# Patient Record
Sex: MALE | Race: WHITE | NOT HISPANIC OR LATINO | Employment: FULL TIME | URBAN - NONMETROPOLITAN AREA
[De-identification: names, ages, dates, MRNs, and addresses within clinical notes are randomized per-mention and may not be internally consistent; named-entity substitution may affect disease eponyms.]

---

## 2017-03-18 ENCOUNTER — APPOINTMENT (OUTPATIENT)
Dept: CT IMAGING | Facility: HOSPITAL | Age: 57
End: 2017-03-18

## 2017-03-18 ENCOUNTER — APPOINTMENT (OUTPATIENT)
Dept: GENERAL RADIOLOGY | Facility: HOSPITAL | Age: 57
End: 2017-03-18

## 2017-03-18 ENCOUNTER — HOSPITAL ENCOUNTER (EMERGENCY)
Facility: HOSPITAL | Age: 57
Discharge: HOME | End: 2017-03-18
Admitting: EMERGENCY MEDICINE

## 2017-03-18 VITALS
OXYGEN SATURATION: 97 % | WEIGHT: 180 LBS | BODY MASS INDEX: 27.28 KG/M2 | RESPIRATION RATE: 18 BRPM | HEART RATE: 66 BPM | SYSTOLIC BLOOD PRESSURE: 169 MMHG | HEIGHT: 68 IN | TEMPERATURE: 97.3 F | DIASTOLIC BLOOD PRESSURE: 95 MMHG

## 2017-03-18 DIAGNOSIS — S82.141A TIBIAL PLATEAU FRACTURE, RIGHT, CLOSED, INITIAL ENCOUNTER: Primary | ICD-10-CM

## 2017-03-18 PROCEDURE — 99283 EMERGENCY DEPT VISIT LOW MDM: CPT

## 2017-03-18 PROCEDURE — 73562 X-RAY EXAM OF KNEE 3: CPT

## 2017-03-18 PROCEDURE — 73700 CT LOWER EXTREMITY W/O DYE: CPT

## 2017-03-18 RX ORDER — OXYCODONE AND ACETAMINOPHEN 7.5; 325 MG/1; MG/1
1 TABLET ORAL EVERY 4 HOURS PRN
Qty: 15 TABLET | Refills: 0 | Status: SHIPPED | OUTPATIENT
Start: 2017-03-18 | End: 2019-10-04 | Stop reason: ALTCHOICE

## 2017-03-18 RX ORDER — OXYCODONE AND ACETAMINOPHEN 7.5; 325 MG/1; MG/1
1 TABLET ORAL ONCE
Status: COMPLETED | OUTPATIENT
Start: 2017-03-18 | End: 2017-03-18

## 2017-03-18 RX ADMIN — OXYCODONE HYDROCHLORIDE AND ACETAMINOPHEN 1 TABLET: 7.5; 325 TABLET ORAL at 11:08

## 2017-03-18 NOTE — ED PROVIDER NOTES
Subjective   HPI Comments: Since 56-year-old white male presents with right knee pain.  He states that he fell at home going down some stairs 2 days ago and twisted his knee.  He has had moderate pain and swelling to the right knee since.    Patient is a 56 y.o. male presenting with knee pain.   History provided by:  Patient   used: No    Knee Pain       Review of Systems   Constitutional: Negative.    HENT: Negative.    Eyes: Negative.    Respiratory: Negative.    Cardiovascular: Negative.    Gastrointestinal: Negative.    Endocrine: Negative.    Genitourinary: Negative.    Musculoskeletal:        Pt states that he slipped while going down some stairs at his home 2 days ago injuring his right knee. He states that he has had increased pain since and continues to have swelling to knee.    Skin: Negative.    Allergic/Immunologic: Negative.    Neurological: Negative.    Hematological: Negative.    Psychiatric/Behavioral: Negative.    All other systems reviewed and are negative.      Past Medical History   Diagnosis Date   • Acid reflux    • Heart disease    • Hyperchloremia    • Hypertension        Allergies   Allergen Reactions   • Crestor [Rosuvastatin]        Past Surgical History   Procedure Laterality Date   • Cholecystectomy     • Hernia repair     • Leg surgery     • Cardiac catheterization         Family History   Problem Relation Age of Onset   • No Known Problems Father    • No Known Problems Mother        Social History     Social History   • Marital status:      Spouse name: N/A   • Number of children: N/A   • Years of education: N/A     Social History Main Topics   • Smoking status: Current Every Day Smoker   • Smokeless tobacco: None   • Alcohol use None   • Drug use: None   • Sexual activity: Not Asked     Other Topics Concern   • None     Social History Narrative       Prior to Admission medications    Medication Sig Start Date End Date Taking? Authorizing Provider  "  atorvastatin (LIPITOR) 10 MG tablet  9/15/16   Historical Provider, MD   clopidogrel (PLAVIX) 75 MG tablet  9/15/16   Historical Provider, MD   isosorbide mononitrate (IMDUR) 30 MG 24 hr tablet  9/15/16   Historical Provider, MD   losartan-hydrochlorothiazide (HYZAAR) 100-12.5 MG per tablet  9/7/16   Historical Provider, MD   metoprolol succinate XL (TOPROL-XL) 25 MG 24 hr tablet  9/7/16   Historical Provider, MD   NEXIUM 40 MG capsule  9/7/16   Historical Provider, MD       Visit Vitals   • /94   • Pulse 83   • Temp 97.3 °F (36.3 °C)   • Resp 16   • Ht 68\" (172.7 cm)   • Wt 180 lb (81.6 kg)   • SpO2 96%   • BMI 27.37 kg/m2       Objective   Physical Exam   Constitutional: He is oriented to person, place, and time. He appears well-developed and well-nourished. He is active. No distress.   HENT:   Head: Normocephalic. Head is without raccoon's eyes, without Altamirano's sign, without abrasion, without contusion and without laceration.   Right Ear: Tympanic membrane normal.   Left Ear: Tympanic membrane normal.   Nose: Nose normal.   Eyes: Conjunctivae, EOM and lids are normal. Pupils are equal, round, and reactive to light.   Neck: Trachea normal and normal range of motion. Neck supple. Normal carotid pulses and no JVD present. No spinous process tenderness and no muscular tenderness present. No rigidity. No tracheal deviation and normal range of motion present.   Cardiovascular: Normal rate, regular rhythm, normal heart sounds, intact distal pulses and normal pulses.    Pulmonary/Chest: Effort normal and breath sounds normal. No accessory muscle usage or stridor. No respiratory distress. He exhibits no mass, no tenderness, no bony tenderness, no laceration, no crepitus, no deformity and no swelling.   Abdominal: Soft. Normal appearance, normal aorta and bowel sounds are normal. He exhibits no distension and no pulsatile midline mass. There is no tenderness.   Musculoskeletal: He exhibits no edema, tenderness or " deformity.        Cervical back: Normal. He exhibits no tenderness, no bony tenderness, no deformity and no pain.        Thoracic back: Normal. He exhibits no tenderness, no bony tenderness, no pain and no spasm.        Lumbar back: Normal. He exhibits normal range of motion, no tenderness, no bony tenderness and no deformity.   Right knee: moderate amt of soft tissue swelling to anterior right knee. Small joint effusion noted. Increased pain with flexion. Pain mostly to medial aspect. Pedal pulses palp    Neurological: He is alert and oriented to person, place, and time. He has normal strength and normal reflexes. He displays normal reflexes. No cranial nerve deficit or sensory deficit. He exhibits normal muscle tone. GCS eye subscore is 4. GCS verbal subscore is 5. GCS motor subscore is 6.   Skin: Skin is warm, dry and intact. He is not diaphoretic. No pallor.   Psychiatric: He has a normal mood and affect. His speech is normal and behavior is normal.   Nursing note and vitals reviewed.      Procedures         Lab Results (last 24 hours)     ** No results found for the last 24 hours. **          CT Lower Extremity Right Without Contrast   Final Result      XR Knee 3 View Right   Final Result          ED Course  ED Course   Comment By Time   Reviewed results with pt and pt family. Reviewed pt and pt care plan with dr glez- also in agreement with pt care plan. Advised pt to call dr braron on Monday for follow up appointment. Ciaran report completed #[id number]. No signs of suspicious activity noted. Will write for small amt of pain medication for acute pain. Reviewed side effects and potential for abuse  Lary Church, APRN 03/18 1213          MDM  Number of Diagnoses or Management Options  Tibial plateau fracture, right, closed, initial encounter: new and requires workup     Amount and/or Complexity of Data Reviewed  Tests in the radiology section of CPT®: ordered and reviewed  Independent visualization of  images, tracings, or specimens: yes    Patient Progress  Patient progress: stable      Final diagnoses:   Tibial plateau fracture, right, closed, initial encounter          Lary Church, APRN  03/18/17 [id number]

## 2019-02-12 ENCOUNTER — TELEPHONE (OUTPATIENT)
Dept: UROLOGY | Facility: CLINIC | Age: 59
End: 2019-02-12

## 2019-02-12 NOTE — TELEPHONE ENCOUNTER
Called and left a message for the patient to call back. He was last seen on 9/29/16 fir elevated PSA which is what he was being referred back to us for. Patient just needs a follow up with [facility] for a Elevated PSA with new med recs in chart in the media tab.

## 2019-02-14 ENCOUNTER — TRANSCRIBE ORDERS (OUTPATIENT)
Dept: ADMINISTRATIVE | Facility: HOSPITAL | Age: 59
End: 2019-02-14

## 2019-02-14 DIAGNOSIS — I10 HYPERTENSION, UNSPECIFIED TYPE: Primary | ICD-10-CM

## 2019-02-15 RX ORDER — LOSARTAN POTASSIUM AND HYDROCHLOROTHIAZIDE 12.5; 1 MG/1; MG/1
1 TABLET ORAL DAILY
COMMUNITY

## 2019-02-15 RX ORDER — ATORVASTATIN CALCIUM 10 MG/1
10 TABLET, FILM COATED ORAL DAILY
COMMUNITY

## 2019-02-18 ENCOUNTER — HOSPITAL ENCOUNTER (OUTPATIENT)
Dept: CARDIOLOGY | Facility: HOSPITAL | Age: 59
Discharge: HOME | End: 2019-02-18
Admitting: INTERNAL MEDICINE

## 2019-02-18 VITALS
BODY MASS INDEX: 26.52 KG/M2 | SYSTOLIC BLOOD PRESSURE: 144 MMHG | HEART RATE: 70 BPM | DIASTOLIC BLOOD PRESSURE: 82 MMHG | HEIGHT: 68 IN | WEIGHT: 175 LBS

## 2019-02-18 LAB
BH CV STRESS BP STAGE 1: NORMAL
BH CV STRESS DURATION MIN STAGE 1: 2
BH CV STRESS DURATION SEC STAGE 1: 49
BH CV STRESS GRADE STAGE 1: 10
BH CV STRESS HR STAGE 1: 128
BH CV STRESS METS STAGE 1: 5
BH CV STRESS PROTOCOL 1: NORMAL
BH CV STRESS RECOVERY BP: NORMAL MMHG
BH CV STRESS RECOVERY HR: 82 BPM
BH CV STRESS SPEED STAGE 1: 1.7
BH CV STRESS STAGE 1: 1
MAXIMAL PREDICTED HEART RATE: 162 BPM
PERCENT MAX PREDICTED HR: 79.01 %
STRESS BASELINE BP: NORMAL MMHG
STRESS BASELINE HR: 71 BPM
STRESS PERCENT HR: 93 %
STRESS POST ESTIMATED WORKLOAD: 5 METS
STRESS POST EXERCISE DUR MIN: 2 MIN
STRESS POST EXERCISE DUR SEC: 49 SEC
STRESS POST PEAK BP: NORMAL MMHG
STRESS POST PEAK HR: 128 BPM
STRESS TARGET HR: 138 BPM

## 2019-02-18 PROCEDURE — 93017 CV STRESS TEST TRACING ONLY: CPT

## 2019-02-18 PROCEDURE — 93350 STRESS TTE ONLY: CPT | Performed by: INTERNAL MEDICINE

## 2019-02-18 PROCEDURE — 25010000002 PERFLUTREN 6.52 MG/ML SUSPENSION: Performed by: INTERNAL MEDICINE

## 2019-02-18 PROCEDURE — 93352 ADMIN ECG CONTRAST AGENT: CPT | Performed by: INTERNAL MEDICINE

## 2019-02-18 PROCEDURE — 93350 STRESS TTE ONLY: CPT

## 2019-02-18 PROCEDURE — 93018 CV STRESS TEST I&R ONLY: CPT | Performed by: INTERNAL MEDICINE

## 2019-02-18 RX ADMIN — PERFLUTREN 8.48 MG: 6.52 INJECTION, SUSPENSION INTRAVENOUS at 14:26

## 2019-02-19 ENCOUNTER — TRANSCRIBE ORDERS (OUTPATIENT)
Dept: ADMINISTRATIVE | Facility: HOSPITAL | Age: 59
End: 2019-02-19

## 2019-02-19 DIAGNOSIS — I15.9 SECONDARY HYPERTENSION: Primary | ICD-10-CM

## 2019-02-21 ENCOUNTER — HOSPITAL ENCOUNTER (OUTPATIENT)
Dept: CARDIOLOGY | Facility: HOSPITAL | Age: 59
Discharge: HOME | End: 2019-02-21
Admitting: NURSE PRACTITIONER

## 2019-02-21 VITALS
WEIGHT: 175.04 LBS | HEART RATE: 60 BPM | BODY MASS INDEX: 26.53 KG/M2 | HEIGHT: 68 IN | SYSTOLIC BLOOD PRESSURE: 129 MMHG | DIASTOLIC BLOOD PRESSURE: 76 MMHG

## 2019-02-21 DIAGNOSIS — I15.9 SECONDARY HYPERTENSION: ICD-10-CM

## 2019-02-21 PROCEDURE — 93017 CV STRESS TEST TRACING ONLY: CPT

## 2019-02-21 PROCEDURE — 25010000002 PERFLUTREN 6.52 MG/ML SUSPENSION: Performed by: INTERNAL MEDICINE

## 2019-02-21 PROCEDURE — 93350 STRESS TTE ONLY: CPT | Performed by: INTERNAL MEDICINE

## 2019-02-21 PROCEDURE — 93018 CV STRESS TEST I&R ONLY: CPT | Performed by: INTERNAL MEDICINE

## 2019-02-21 PROCEDURE — 93352 ADMIN ECG CONTRAST AGENT: CPT | Performed by: INTERNAL MEDICINE

## 2019-02-21 PROCEDURE — 93350 STRESS TTE ONLY: CPT

## 2019-02-21 PROCEDURE — 25010000003 DOBUTAMINE PER 250 MG: Performed by: INTERNAL MEDICINE

## 2019-02-21 RX ORDER — AMLODIPINE BESYLATE 10 MG/1
10 TABLET ORAL DAILY
COMMUNITY

## 2019-02-21 RX ORDER — LOSARTAN POTASSIUM AND HYDROCHLOROTHIAZIDE 25; 100 MG/1; MG/1
1 TABLET ORAL DAILY
COMMUNITY
End: 2019-10-04 | Stop reason: ALTCHOICE

## 2019-02-21 RX ORDER — DOBUTAMINE HYDROCHLORIDE 100 MG/100ML
10-50 INJECTION INTRAVENOUS CONTINUOUS
Status: DISCONTINUED | OUTPATIENT
Start: 2019-02-21 | End: 2019-02-22 | Stop reason: HOSPADM

## 2019-02-21 RX ORDER — METOPROLOL TARTRATE 50 MG/1
50 TABLET, FILM COATED ORAL 2 TIMES DAILY
COMMUNITY

## 2019-02-21 RX ORDER — GUAIFENESIN 600 MG/1
1200 TABLET, EXTENDED RELEASE ORAL 2 TIMES DAILY PRN
COMMUNITY
End: 2020-02-28

## 2019-02-21 RX ADMIN — Medication 10 MCG/KG/MIN: at 15:10

## 2019-02-21 RX ADMIN — PERFLUTREN 8.48 MG: 6.52 INJECTION, SUSPENSION INTRAVENOUS at 15:09

## 2019-02-21 RX ADMIN — ATROPINE SULFATE 1 MG: 0.1 INJECTION, SOLUTION ENDOTRACHEAL; INTRAMUSCULAR; INTRAVENOUS; SUBCUTANEOUS at 15:24

## 2019-02-22 LAB
BH CV STRESS BP STAGE 1: NORMAL
BH CV STRESS BP STAGE 2: NORMAL
BH CV STRESS BP STAGE 3: NORMAL
BH CV STRESS DOSE DOBUTAMINE STAGE 1: 10
BH CV STRESS DOSE DOBUTAMINE STAGE 2: 20
BH CV STRESS DOSE DOBUTAMINE STAGE 3: 30
BH CV STRESS DURATION MIN STAGE 1: 3
BH CV STRESS DURATION MIN STAGE 2: 3
BH CV STRESS DURATION MIN STAGE 3: 3
BH CV STRESS DURATION SEC STAGE 1: 0
BH CV STRESS DURATION SEC STAGE 2: 0
BH CV STRESS DURATION SEC STAGE 3: 26
BH CV STRESS HR STAGE 1: 68
BH CV STRESS HR STAGE 2: 97
BH CV STRESS HR STAGE 3: 139
BH CV STRESS PROTOCOL 1: NORMAL
BH CV STRESS RECOVERY BP: NORMAL MMHG
BH CV STRESS RECOVERY HR: 88 BPM
BH CV STRESS STAGE 1: 1
BH CV STRESS STAGE 2: 2
BH CV STRESS STAGE 3: 3
MAXIMAL PREDICTED HEART RATE: 162 BPM
PERCENT MAX PREDICTED HR: 85.8 %
STRESS BASELINE BP: NORMAL MMHG
STRESS BASELINE HR: 60 BPM
STRESS PERCENT HR: 101 %
STRESS POST EXERCISE DUR MIN: 9 MIN
STRESS POST EXERCISE DUR SEC: 26 SEC
STRESS POST PEAK BP: NORMAL MMHG
STRESS POST PEAK HR: 139 BPM
STRESS TARGET HR: 138 BPM

## 2019-03-01 ENCOUNTER — TELEPHONE (OUTPATIENT)
Dept: GASTROENTEROLOGY | Age: 59
End: 2019-03-01

## 2019-07-12 ENCOUNTER — OFFICE VISIT (OUTPATIENT)
Dept: UROLOGY | Facility: CLINIC | Age: 59
End: 2019-07-12

## 2019-07-12 VITALS — WEIGHT: 175 LBS | HEIGHT: 68 IN | BODY MASS INDEX: 26.52 KG/M2 | TEMPERATURE: 97.8 F

## 2019-07-12 DIAGNOSIS — R97.20 ELEVATED PROSTATE SPECIFIC ANTIGEN (PSA): Primary | ICD-10-CM

## 2019-07-12 DIAGNOSIS — N40.0 BENIGN PROSTATIC HYPERPLASIA WITHOUT LOWER URINARY TRACT SYMPTOMS: ICD-10-CM

## 2019-07-12 LAB
BILIRUB BLD-MCNC: NEGATIVE MG/DL
CLARITY, POC: CLEAR
COLOR UR: YELLOW
GLUCOSE UR STRIP-MCNC: NEGATIVE MG/DL
KETONES UR QL: NEGATIVE
LEUKOCYTE EST, POC: NEGATIVE
NITRITE UR-MCNC: NEGATIVE MG/ML
PH UR: 7 [PH] (ref 5–8)
PROT UR STRIP-MCNC: NEGATIVE MG/DL
RBC # UR STRIP: NEGATIVE /UL
SP GR UR: 1.01 (ref 1–1.03)
UROBILINOGEN UR QL: NORMAL

## 2019-07-12 PROCEDURE — 81001 URINALYSIS AUTO W/SCOPE: CPT | Performed by: UROLOGY

## 2019-07-12 PROCEDURE — 99214 OFFICE O/P EST MOD 30 MIN: CPT | Performed by: UROLOGY

## 2019-07-12 NOTE — PATIENT INSTRUCTIONS
BMI for Adults  Body mass index (BMI) is a number that is calculated from a person's weight and height. In most adults, the number is used to find how much of an adult's weight is made up of fat. BMI is not as accurate as a direct measure of body fat.  How is BMI calculated?  BMI is calculated by dividing weight in kilograms by height in meters squared. It can also be calculated by dividing weight in pounds by height in inches squared, then multiplying the resulting number by 703. Charts are available to help you find your BMI quickly and easily without doing this calculation.  How is BMI interpreted?  Health care professionals use BMI charts to identify whether an adult is underweight, at a normal weight, or overweight based on the following guidelines:  · Underweight: BMI less than 18.5.  · Normal weight: BMI between 18.5 and 24.9.  · Overweight: BMI between 25 and 29.9.  · Obese: BMI of 30 and above.    BMI is usually interpreted the same for males and females.  Weight includes both fat and muscle, so someone with a muscular build, such as an athlete, may have a BMI that is higher than 24.9. In cases like these, BMI may not accurately depict body fat. To determine if excess body fat is the cause of a BMI of 25 or higher, further assessments may need to be done by a health care provider.  Why is BMI a useful tool?  BMI is used to identify a possible weight problem that may be related to a medical problem or may increase the risk for medical problems. BMI can also be used to promote changes to reach a healthy weight.  This information is not intended to replace advice given to you by your health care provider. Make sure you discuss any questions you have with your health care provider.  Document Released: 08/29/2005 Document Revised: 04/27/2017 Document Reviewed: 05/15/2015  [employer] Interactive Patient Education © 2018 [employer]    Smoking Tobacco Information, Adult  Smoking tobacco will very likely harm your  health. Tobacco contains a poisonous (toxic), colorless chemical called nicotine. Nicotine affects the brain and makes tobacco addictive. This change in your brain can make it hard to stop smoking. Tobacco also has other toxic chemicals that can hurt your body and raise your risk of many cancers.  How can smoking tobacco affect me?  Smoking tobacco can increase your chances of having serious health conditions, such as:  · Cancer. Smoking is most commonly associated with lung cancer, but can lead to cancer in other parts of the body.  · Chronic obstructive pulmonary disease (COPD). This is a long-term lung condition that makes it hard to breathe. It also gets worse over time.  · High blood pressure (hypertension), heart disease, stroke, or heart attack.  · Lung infections, such as pneumonia.  · Cataracts. This is when the lenses in the eyes become clouded.  · Digestive problems. This may include peptic ulcers, heartburn, and gastroesophageal reflux disease (GERD).  · Oral health problems, such as gum disease and tooth loss.  · Loss of taste and smell.    Smoking can affect your appearance by causing:  · Wrinkles.  · Yellow or stained teeth, fingers, and fingernails.    Smoking tobacco can also affect your social life.  · Many workplaces, restaurants, hotels, and public places are tobacco-free. This means that you may experience challenges in finding places to smoke when away from home.  · The cost of a smoking habit can be expensive. Expenses for someone who smokes come in two ways:  ? You spend money on a regular basis to buy tobacco.  ? Your health care costs in the long-term are higher if you smoke.  · Tobacco smoke can also affect the health of those around you. Children of smokers have greater chances of:  ? Sudden infant death syndrome (SIDS).  ? Ear infections.  ? Lung infections.    What lifestyle changes can be made?  · Do not start smoking. Quit if you already do.  · To quit smoking:  ? Make a plan to quit  smoking and commit yourself to it. Look for programs to help you and ask your health care provider for recommendations and ideas.  ? Talk with your health care provider about using nicotine replacement medicines to help you quit. Medicine replacement medicines include gum, lozenges, patches, sprays, or pills.  ? Do not replace cigarette smoking with electronic cigarettes, which are commonly called e-cigarettes. The safety of e-cigarettes is not known, and some may contain harmful chemicals.  ? Avoid places, people, or situations that tempt you to smoke.  ? If you try to quit but return to smoking, don't give up hope. It is very common for people to try a number of times before they fully succeed. When you feel ready again, give it another try.  · Quitting smoking might affect the way you eat as well as your weight. Be prepared to monitor your eating habits. Get support in planning and following a healthy diet.  · Ask your health care provider about having regular tests (screenings) to check for cancer. This may include blood tests, imaging tests, and other tests.  · Exercise regularly. Consider taking walks, joining a gym, or doing yoga or exercise classes.  · Develop skills to manage your stress. These skills include meditation.  What are the benefits of quitting smoking?  By quitting smoking, you may:  · Lower your risk of getting cancer and other diseases caused by smoking.  · Live longer.  · Breathe better.  · Lower your blood pressure and heart rate.  · Stop your addiction to tobacco.  · Stop creating secondhand smoke that hurts other people.  · Improve your sense of taste and smell.  · Look better over time, due to having fewer wrinkles and less staining.    What can happen if changes are not made?  If you do not stop smoking, you may:  · Get cancer and other diseases.  · Develop COPD or other long-term (chronic) lung conditions.  · Develop serious problems with your heart and blood vessels (cardiovascular  system).  · Need more tests to screen for problems caused by smoking.  · Have higher, long-term healthcare costs from medicines or treatments related to smoking.  · Continue to have worsening changes in your lungs, mouth, and nose.    Where to find support  To get support to quit smoking, consider:  · Asking your health care provider for more information and resources.  · Taking classes to learn more about quitting smoking.  · Looking for local organizations that offer resources about quitting smoking.  · Joining a support group for people who want to quit smoking in your local community.    Where to find more information  You may find more information about quitting smoking from:  · HelpGuide.org: www.helpguide.org/articles/addictions/how-to-quit-smoking.htm  · Smokefree.gov: smokefree.gov  · American Lung Association: www.lung.org    Contact a health care provider if:  · You have problems breathing.  · Your lips, nose, or fingers turn blue.  · You have chest pain.  · You are coughing up blood.  · You feel faint or you pass out.  · You have other noticeable changes that cause you to worry.  Summary  · Smoking tobacco can negatively affect your health, the health of those around you, your finances, and your social life.  · Do not start smoking. Quit if you already do. If you need help quitting, ask your health care provider.  · Think about joining a support group for people who want to quit smoking in your local community. There are many effective programs that will help you to quit this behavior.  This information is not intended to replace advice given to you by your health care provider. Make sure you discuss any questions you have with your health care provider.  Document Released: 01/02/2018 Document Revised: 01/02/2018 Document Reviewed: 01/02/2018  [employer]vier Interactive Patient Education © 2019 [employer]

## 2019-07-12 NOTE — PROGRESS NOTES
Mr. Castillo is 58 y.o. male    Chief Complaint   Patient presents with   • Elevated PSA       History of Present Illness  Elevated PSA  Patient is here with an elevated PSA. He has no personal history of prostate cancer. His AUA Symptom Score is 5/35, manifested as irritative symptoms including frequency. He has a prior genitourinary history of prostate biopsy.  Previous PSA values are :   8.4   He reports frequency. He denies incomplete emptying and intermittency. Patient states symptoms are of mild severity. Onset of symptoms was several years ago and was gradual in onset.    The following portions of the patient's history were reviewed and updated as appropriate: allergies, current medications, past family history, past medical history, past social history, past surgical history and problem list.    Review of Systems   Constitutional: Negative for chills and fever.   Gastrointestinal: Negative for abdominal pain, anal bleeding and blood in stool.   Genitourinary: Positive for frequency. Negative for decreased urine volume, difficulty urinating, discharge, dysuria, enuresis, flank pain, genital sores, hematuria, penile pain, penile swelling, scrotal swelling, testicular pain and urgency.       I have reviewed the review of systems      Current Outpatient Medications:   •  amLODIPine (NORVASC) 10 MG tablet, Take 10 mg by mouth Daily., Disp: , Rfl:   •  atorvastatin (LIPITOR) 10 MG tablet, , Disp: , Rfl:   •  clopidogrel (PLAVIX) 75 MG tablet, , Disp: , Rfl:   •  guaiFENesin (MUCINEX) 600 MG 12 hr tablet, Take 1,200 mg by mouth 2 (Two) Times a Day As Needed for Cough., Disp: , Rfl:   •  isosorbide mononitrate (IMDUR) 30 MG 24 hr tablet, , Disp: , Rfl:   •  losartan-hydrochlorothiazide (HYZAAR) 100-12.5 MG per tablet, , Disp: , Rfl:   •  losartan-hydrochlorothiazide (HYZAAR) 100-25 MG per tablet, Take 1 tablet by mouth Daily., Disp: , Rfl:   •  metoprolol succinate XL (TOPROL-XL) 25 MG 24 hr tablet, , Disp: , Rfl:  "  •  metoprolol tartrate (LOPRESSOR) 50 MG tablet, Take 50 mg by mouth 2 (Two) Times a Day., Disp: , Rfl:   •  NEXIUM 40 MG capsule, 20 mg 2 (Two) Times a Day As Needed., Disp: , Rfl:   •  oxyCODONE-acetaminophen (PERCOCET) 7.5-325 MG per tablet, Take 1 tablet by mouth Every 4 (Four) Hours As Needed for Moderate Pain (4-6)., Disp: 15 tablet, Rfl: 0    Past Medical History:   Diagnosis Date   • Acid reflux    • Heart disease    • Hyperchloremia    • Hypertension        Past Surgical History:   Procedure Laterality Date   • CARDIAC CATHETERIZATION     • CHOLECYSTECTOMY     • HERNIA REPAIR     • LEG SURGERY         Social History     Socioeconomic History   • Marital status:      Spouse name: Not on file   • Number of children: Not on file   • Years of education: Not on file   • Highest education level: Not on file   Tobacco Use   • Smoking status: Current Every Day Smoker       Family History   Problem Relation Age of Onset   • No Known Problems Father    • No Known Problems Mother        Objective    Temp 97.8 °F (36.6 °C)   Ht 172.7 cm (67.99\")   Wt 79.4 kg (175 lb)   BMI 26.61 kg/m²     Physical Exam  40-50 prostate no nodules  Hospital Outpatient Visit on 02/21/2019   Component Date Value Ref Range Status   •  CV STRESS PROTOCOL 1 02/21/2019 Pharmacologic   Final   • Stage 1 02/21/2019 1   Final   • HR Stage 1 02/21/2019 68   Final   • BP Stage 1 02/21/2019 119/68   Final   • Duration Min Stage 1 02/21/2019 3   Final   • Duration Sec Stage 1 02/21/2019 0   Final   • Stress Dose Dobutamine Stage 1 02/21/2019 10   Final   • Stage 2 02/21/2019 2   Final   • HR Stage 2 02/21/2019 97   Final   • BP Stage 2 02/21/2019 154/78   Final   • Duration Min Stage 2 02/21/2019 3   Final   • Duration Sec Stage 2 02/21/2019 0   Final   • Stress Dose Dobutamine Stage 2 02/21/2019 20   Final   • Stage 3 02/21/2019 3   Final   • HR Stage 3 02/21/2019 139   Final   • BP Stage 3 02/21/2019 162/83   Final   • Duration Min " Stage 3 02/21/2019 3   Final   • Duration Sec Stage 3 02/21/2019 26   Final   • Stress Dose Dobutamine Stage 3 02/21/2019 30   Final   • Baseline HR 02/21/2019 60  bpm Final   • Baseline BP 02/21/2019 129/76  mmHg Final   • Peak HR 02/21/2019 139  bpm Final   • Percent Max Pred HR 02/21/2019 85.80  % Final   • Percent Target HR 02/21/2019 101  % Final   • Peak BP 02/21/2019 162/83  mmHg Final   • Recovery HR 02/21/2019 88  bpm Final   • Recovery BP 02/21/2019 117/80  mmHg Final   • Target HR (85%) 02/21/2019 138  bpm Final   • Max. Pred. HR (100%) 02/21/2019 162  bpm Final   • Exercise duration (min) 02/21/2019 9  min Final   • Exercise duration (sec) 02/21/2019 26  sec Final       Results for orders placed or performed in visit on 07/12/19   POC Urinalysis Dipstick, Multipro   Result Value Ref Range    Color Yellow Yellow, Straw, Dark Yellow, Ella    Clarity, UA Clear Clear    Glucose, UA Negative Negative, 1000 mg/dL (3+) mg/dL    Bilirubin Negative Negative    Ketones, UA Negative Negative    Specific Gravity  1.010 1.005 - 1.030    Blood, UA Negative Negative    pH, Urine 7.0 5.0 - 8.0    Protein, POC Negative Negative mg/dL    Urobilinogen, UA Normal Normal    Nitrite, UA Negative Negative    Leukocytes Negative Negative   Patient's Body mass index is 26.61 kg/m². BMI is above normal parameters. Recommendations include: educational material.    Assessment and Plan    Kodi was seen today for elevated psa.    Diagnoses and all orders for this visit:    Elevated prostate specific antigen (PSA)  -     POC Urinalysis Dipstick, Multipro  -     MRI Pelvis With & Without Contrast; Future    Benign prostatic hyperplasia without lower urinary tract symptoms    Elevated PSA, chronic condition, worsening.  BPH, chronic, stable.  In addressing his BPH, patient does have an enlarged prostate on exam but has very few symptoms.  His AUA symptom score is 5 with a bother score of 2 meaning he is mostly satisfied.  He does  not wish to start any medications we will continue to monitor.  Patient is known to me for an elevated PSA.  He was seen back in 2016 and had an elevated PSA to 4.4.  At that time it was suggested he have an MRI of the prostate as he had previously had a negative prostate biopsy.  He did not follow-up at his six-month appointment.  [facility]  I discussed with him that this is a very abnormal PSA.3.  I would suggest an MRI of the prostate in anticipation of possible prostate biopsy.  He is expressed understanding would like to move forward with an MRI.  Unfortunately due to his work schedule on the [employer], he is going to be out of town for the next 4 weeks.  This MRI will have to be scheduled after he gets back and I will have him follow-up with Dr. Roe for discussion of results.
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
## 2019-08-26 ENCOUNTER — CONSULT (OUTPATIENT)
Dept: ONCOLOGY | Facility: CLINIC | Age: 59
End: 2019-08-26

## 2019-08-26 ENCOUNTER — HOSPITAL ENCOUNTER (OUTPATIENT)
Dept: GENERAL RADIOLOGY | Facility: HOSPITAL | Age: 59
Discharge: HOME | End: 2019-08-26
Admitting: INTERNAL MEDICINE

## 2019-08-26 ENCOUNTER — LAB (OUTPATIENT)
Dept: LAB | Facility: HOSPITAL | Age: 59
End: 2019-08-26

## 2019-08-26 VITALS
RESPIRATION RATE: 16 BRPM | OXYGEN SATURATION: 97 % | BODY MASS INDEX: 26.64 KG/M2 | HEIGHT: 68 IN | TEMPERATURE: 98.7 F | WEIGHT: 175.8 LBS | DIASTOLIC BLOOD PRESSURE: 84 MMHG | SYSTOLIC BLOOD PRESSURE: 138 MMHG | HEART RATE: 61 BPM

## 2019-08-26 DIAGNOSIS — D72.828 OTHER ELEVATED WHITE BLOOD CELL (WBC) COUNT: Primary | ICD-10-CM

## 2019-08-26 LAB
ALBUMIN SERPL-MCNC: 4.5 G/DL (ref 3.5–5)
ALBUMIN/GLOB SERPL: 1.3 G/DL (ref 1.1–2.5)
ALP SERPL-CCNC: 84 U/L (ref 24–120)
ALT SERPL W P-5'-P-CCNC: 35 U/L (ref 0–54)
ANION GAP SERPL CALCULATED.3IONS-SCNC: 9 MMOL/L (ref 4–13)
AST SERPL-CCNC: 36 U/L (ref 7–45)
BASOPHILS # BLD AUTO: 0.07 10*3/MM3 (ref 0–0.2)
BASOPHILS NFR BLD AUTO: 0.6 % (ref 0–1.5)
BILIRUB SERPL-MCNC: 0.9 MG/DL (ref 0.1–1)
BUN BLD-MCNC: 16 MG/DL (ref 5–21)
BUN/CREAT SERPL: 12.3 (ref 7–25)
CALCIUM SPEC-SCNC: 9.7 MG/DL (ref 8.4–10.4)
CHLORIDE SERPL-SCNC: 104 MMOL/L (ref 98–110)
CO2 SERPL-SCNC: 30 MMOL/L (ref 24–31)
CREAT BLD-MCNC: 1.3 MG/DL (ref 0.5–1.4)
DEPRECATED RDW RBC AUTO: 44.9 FL (ref 37–54)
EOSINOPHIL # BLD AUTO: 0.22 10*3/MM3 (ref 0–0.4)
EOSINOPHIL NFR BLD AUTO: 1.8 % (ref 0.3–6.2)
ERYTHROCYTE [DISTWIDTH] IN BLOOD BY AUTOMATED COUNT: 13 % (ref 12.3–15.4)
GFR SERPL CREATININE-BSD FRML MDRD: 57 ML/MIN/1.73
GLOBULIN UR ELPH-MCNC: 3.6 GM/DL
GLUCOSE BLD-MCNC: 87 MG/DL (ref 70–100)
HCT VFR BLD AUTO: 45.2 % (ref 37.5–51)
HGB BLD-MCNC: 15.6 G/DL (ref 13–17.7)
HOLD SPECIMEN: NORMAL
IMM GRANULOCYTES # BLD AUTO: 0.12 10*3/MM3 (ref 0–0.05)
IMM GRANULOCYTES NFR BLD AUTO: 1 % (ref 0–0.5)
LDH SERPL-CCNC: 426 U/L (ref 265–665)
LYMPHOCYTES # BLD AUTO: 2.54 10*3/MM3 (ref 0.7–3.1)
LYMPHOCYTES NFR BLD AUTO: 20.9 % (ref 19.6–45.3)
MCH RBC QN AUTO: 32.6 PG (ref 26.6–33)
MCHC RBC AUTO-ENTMCNC: 34.5 G/DL (ref 31.5–35.7)
MCV RBC AUTO: 94.4 FL (ref 79–97)
MONOCYTES # BLD AUTO: 0.82 10*3/MM3 (ref 0.1–0.9)
MONOCYTES NFR BLD AUTO: 6.7 % (ref 5–12)
NEUTROPHILS # BLD AUTO: 8.39 10*3/MM3 (ref 1.7–7)
NEUTROPHILS NFR BLD AUTO: 69 % (ref 42.7–76)
NRBC BLD AUTO-RTO: 0 /100 WBC (ref 0–0.2)
PLATELET # BLD AUTO: 223 10*3/MM3 (ref 140–450)
PMV BLD AUTO: 11.1 FL (ref 6–12)
POTASSIUM BLD-SCNC: 4.1 MMOL/L (ref 3.5–5.3)
PROT SERPL-MCNC: 8.1 G/DL (ref 6.3–8.7)
RBC # BLD AUTO: 4.79 10*6/MM3 (ref 4.14–5.8)
SODIUM BLD-SCNC: 143 MMOL/L (ref 135–145)
WBC NRBC COR # BLD: 12.16 10*3/MM3 (ref 3.4–10.8)
WHOLE BLOOD HOLD SPECIMEN: NORMAL
WHOLE BLOOD HOLD SPECIMEN: NORMAL

## 2019-08-26 PROCEDURE — 85025 COMPLETE CBC W/AUTO DIFF WBC: CPT | Performed by: INTERNAL MEDICINE

## 2019-08-26 PROCEDURE — 82232 ASSAY OF BETA-2 PROTEIN: CPT | Performed by: INTERNAL MEDICINE

## 2019-08-26 PROCEDURE — 83615 LACTATE (LD) (LDH) ENZYME: CPT | Performed by: INTERNAL MEDICINE

## 2019-08-26 PROCEDURE — 81403 MOPATH PROCEDURE LEVEL 4: CPT | Performed by: INTERNAL MEDICINE

## 2019-08-26 PROCEDURE — 99205 OFFICE O/P NEW HI 60 MIN: CPT | Performed by: INTERNAL MEDICINE

## 2019-08-26 PROCEDURE — 36415 COLL VENOUS BLD VENIPUNCTURE: CPT | Performed by: INTERNAL MEDICINE

## 2019-08-26 PROCEDURE — 71046 X-RAY EXAM CHEST 2 VIEWS: CPT

## 2019-08-26 PROCEDURE — 80053 COMPREHEN METABOLIC PANEL: CPT | Performed by: INTERNAL MEDICINE

## 2019-08-28 LAB — B2 MICROGLOB SERPL-MCNC: 2.3 MG/L (ref 0.6–2.4)

## 2019-09-04 LAB
JAK2 EXONS 12-15 MUT DET PCR: NORMAL
LAB DIRECTOR NAME PROVIDER: NORMAL
LABORATORY COMMENT REPORT: NORMAL
Lab: NORMAL
METHOD: NORMAL

## 2019-09-27 ENCOUNTER — ANESTHESIA EVENT (OUTPATIENT)
Dept: ENDOSCOPY | Age: 59
End: 2019-09-27
Payer: COMMERCIAL

## 2019-09-30 ENCOUNTER — LAB (OUTPATIENT)
Dept: LAB | Facility: HOSPITAL | Age: 59
End: 2019-09-30

## 2019-09-30 DIAGNOSIS — D72.828 OTHER ELEVATED WHITE BLOOD CELL (WBC) COUNT: ICD-10-CM

## 2019-09-30 LAB
B2 MICROGLOB SERPL-MCNC: 2.9 MG/L (ref 0.8–2.2)
BASOPHILS # BLD AUTO: 0.08 10*3/MM3 (ref 0–0.2)
BASOPHILS NFR BLD AUTO: 0.6 % (ref 0–1.5)
DEPRECATED RDW RBC AUTO: 43.1 FL (ref 37–54)
EOSINOPHIL # BLD AUTO: 0.23 10*3/MM3 (ref 0–0.4)
EOSINOPHIL NFR BLD AUTO: 1.8 % (ref 0.3–6.2)
ERYTHROCYTE [DISTWIDTH] IN BLOOD BY AUTOMATED COUNT: 12.4 % (ref 12.3–15.4)
HCT VFR BLD AUTO: 43.6 % (ref 37.5–51)
HGB BLD-MCNC: 15 G/DL (ref 13–17.7)
HOLD SPECIMEN: NORMAL
IMM GRANULOCYTES # BLD AUTO: 0.12 10*3/MM3 (ref 0–0.05)
IMM GRANULOCYTES NFR BLD AUTO: 0.9 % (ref 0–0.5)
LDH SERPL-CCNC: 145 U/L (ref 135–225)
LYMPHOCYTES # BLD AUTO: 2.34 10*3/MM3 (ref 0.7–3.1)
LYMPHOCYTES NFR BLD AUTO: 17.8 % (ref 19.6–45.3)
MCH RBC QN AUTO: 32.5 PG (ref 26.6–33)
MCHC RBC AUTO-ENTMCNC: 34.4 G/DL (ref 31.5–35.7)
MCV RBC AUTO: 94.4 FL (ref 79–97)
MONOCYTES # BLD AUTO: 1.03 10*3/MM3 (ref 0.1–0.9)
MONOCYTES NFR BLD AUTO: 7.8 % (ref 5–12)
NEUTROPHILS # BLD AUTO: 9.33 10*3/MM3 (ref 1.7–7)
NEUTROPHILS NFR BLD AUTO: 71.1 % (ref 42.7–76)
NRBC BLD AUTO-RTO: 0 /100 WBC (ref 0–0.2)
PLATELET # BLD AUTO: 246 10*3/MM3 (ref 140–450)
PMV BLD AUTO: 11.1 FL (ref 6–12)
RBC # BLD AUTO: 4.62 10*6/MM3 (ref 4.14–5.8)
WBC NRBC COR # BLD: 13.13 10*3/MM3 (ref 3.4–10.8)

## 2019-09-30 PROCEDURE — 85025 COMPLETE CBC W/AUTO DIFF WBC: CPT | Performed by: INTERNAL MEDICINE

## 2019-09-30 PROCEDURE — 83615 LACTATE (LD) (LDH) ENZYME: CPT

## 2019-09-30 PROCEDURE — 82232 ASSAY OF BETA-2 PROTEIN: CPT

## 2019-09-30 PROCEDURE — 36415 COLL VENOUS BLD VENIPUNCTURE: CPT

## 2019-10-01 ENCOUNTER — ANESTHESIA (OUTPATIENT)
Dept: ENDOSCOPY | Age: 59
End: 2019-10-01
Payer: COMMERCIAL

## 2019-10-01 ENCOUNTER — HOSPITAL ENCOUNTER (OUTPATIENT)
Age: 59
Setting detail: OUTPATIENT SURGERY
Discharge: HOME | End: 2019-10-01
Attending: INTERNAL MEDICINE | Admitting: INTERNAL MEDICINE
Payer: COMMERCIAL

## 2019-10-01 VITALS
OXYGEN SATURATION: 95 % | TEMPERATURE: 98 F | SYSTOLIC BLOOD PRESSURE: 94 MMHG | BODY MASS INDEX: 26.52 KG/M2 | HEART RATE: 60 BPM | HEIGHT: 68 IN | RESPIRATION RATE: 18 BRPM | DIASTOLIC BLOOD PRESSURE: 64 MMHG | WEIGHT: 175 LBS

## 2019-10-01 VITALS
RESPIRATION RATE: 22 BRPM | OXYGEN SATURATION: 94 % | DIASTOLIC BLOOD PRESSURE: 59 MMHG | SYSTOLIC BLOOD PRESSURE: 92 MMHG

## 2019-10-01 LAB — REF LAB TEST METHOD: NORMAL

## 2019-10-01 PROCEDURE — 7100000010 HC PHASE II RECOVERY - FIRST 15 MIN: Performed by: INTERNAL MEDICINE

## 2019-10-01 PROCEDURE — 3609010300 HC COLONOSCOPY W/BIOPSY SINGLE/MULTIPLE: Performed by: INTERNAL MEDICINE

## 2019-10-01 PROCEDURE — 45380 COLONOSCOPY AND BIOPSY: CPT | Performed by: INTERNAL MEDICINE

## 2019-10-01 PROCEDURE — 88305 TISSUE EXAM BY PATHOLOGIST: CPT

## 2019-10-01 PROCEDURE — 2709999900 HC NON-CHARGEABLE SUPPLY: Performed by: INTERNAL MEDICINE

## 2019-10-01 PROCEDURE — 7100000011 HC PHASE II RECOVERY - ADDTL 15 MIN: Performed by: INTERNAL MEDICINE

## 2019-10-01 PROCEDURE — 2500000003 HC RX 250 WO HCPCS: Performed by: NURSE ANESTHETIST, CERTIFIED REGISTERED

## 2019-10-01 PROCEDURE — 6360000002 HC RX W HCPCS: Performed by: NURSE ANESTHETIST, CERTIFIED REGISTERED

## 2019-10-01 PROCEDURE — 2580000003 HC RX 258: Performed by: INTERNAL MEDICINE

## 2019-10-01 PROCEDURE — 3700000001 HC ADD 15 MINUTES (ANESTHESIA): Performed by: INTERNAL MEDICINE

## 2019-10-01 PROCEDURE — 3700000000 HC ANESTHESIA ATTENDED CARE: Performed by: INTERNAL MEDICINE

## 2019-10-01 RX ORDER — PROPOFOL 10 MG/ML
INJECTION, EMULSION INTRAVENOUS PRN
Status: DISCONTINUED | OUTPATIENT
Start: 2019-10-01 | End: 2019-10-01 | Stop reason: SDUPTHER

## 2019-10-01 RX ORDER — SODIUM CHLORIDE, SODIUM LACTATE, POTASSIUM CHLORIDE, CALCIUM CHLORIDE 600; 310; 30; 20 MG/100ML; MG/100ML; MG/100ML; MG/100ML
INJECTION, SOLUTION INTRAVENOUS CONTINUOUS
Status: DISCONTINUED | OUTPATIENT
Start: 2019-10-01 | End: 2019-10-01 | Stop reason: HOSPADM

## 2019-10-01 RX ORDER — LIDOCAINE HYDROCHLORIDE 20 MG/ML
INJECTION, SOLUTION INFILTRATION; PERINEURAL PRN
Status: DISCONTINUED | OUTPATIENT
Start: 2019-10-01 | End: 2019-10-01 | Stop reason: SDUPTHER

## 2019-10-01 RX ADMIN — PROPOFOL 450 MG: 10 INJECTION, EMULSION INTRAVENOUS at 09:16

## 2019-10-01 RX ADMIN — LIDOCAINE HYDROCHLORIDE 40 MG: 20 INJECTION, SOLUTION INFILTRATION; PERINEURAL at 09:16

## 2019-10-01 RX ADMIN — SODIUM CHLORIDE, POTASSIUM CHLORIDE, SODIUM LACTATE AND CALCIUM CHLORIDE: 600; 310; 30; 20 INJECTION, SOLUTION INTRAVENOUS at 07:58

## 2019-10-01 ASSESSMENT — PAIN SCALES - GENERAL
PAINLEVEL_OUTOF10: 0

## 2019-10-01 ASSESSMENT — PAIN - FUNCTIONAL ASSESSMENT: PAIN_FUNCTIONAL_ASSESSMENT: 0-10

## 2019-10-01 ASSESSMENT — LIFESTYLE VARIABLES: SMOKING_STATUS: 1

## 2019-10-02 PROBLEM — D72.829 LEUKOCYTOSIS: Status: ACTIVE | Noted: 2019-10-02
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
## 2019-10-04 ENCOUNTER — OFFICE VISIT (OUTPATIENT)
Dept: ONCOLOGY | Facility: CLINIC | Age: 59
End: 2019-10-04

## 2019-10-04 VITALS
HEIGHT: 68 IN | OXYGEN SATURATION: 95 % | DIASTOLIC BLOOD PRESSURE: 80 MMHG | RESPIRATION RATE: 16 BRPM | TEMPERATURE: 96.1 F | HEART RATE: 58 BPM | BODY MASS INDEX: 25.99 KG/M2 | WEIGHT: 171.5 LBS | SYSTOLIC BLOOD PRESSURE: 140 MMHG

## 2019-10-04 DIAGNOSIS — D72.829 LEUKOCYTOSIS, UNSPECIFIED TYPE: Primary | ICD-10-CM

## 2019-10-04 DIAGNOSIS — N40.0 BENIGN PROSTATIC HYPERPLASIA WITHOUT LOWER URINARY TRACT SYMPTOMS: ICD-10-CM

## 2019-10-04 DIAGNOSIS — N18.30 STAGE 3 CHRONIC KIDNEY DISEASE (HCC): ICD-10-CM

## 2019-10-04 PROCEDURE — 99215 OFFICE O/P EST HI 40 MIN: CPT | Performed by: INTERNAL MEDICINE

## 2019-10-25 ENCOUNTER — TELEPHONE (OUTPATIENT)
Dept: ONCOLOGY | Facility: CLINIC | Age: 59
End: 2019-10-25

## 2019-10-25 NOTE — TELEPHONE ENCOUNTER
Nick Perez called to let you know that this pt does not have an appt yet. Please call Alia at [phone] on Monday 10/28/19 regarding this.

## 2020-01-09 ENCOUNTER — TRANSCRIBE ORDERS (OUTPATIENT)
Dept: ADMINISTRATIVE | Facility: HOSPITAL | Age: 60
End: 2020-01-09

## 2020-01-09 DIAGNOSIS — N18.30 CHRONIC KIDNEY DISEASE, STAGE III (MODERATE) (HCC): Primary | ICD-10-CM

## 2020-01-13 ENCOUNTER — HOSPITAL ENCOUNTER (OUTPATIENT)
Dept: ULTRASOUND IMAGING | Facility: HOSPITAL | Age: 60
Discharge: HOME | End: 2020-01-13
Admitting: INTERNAL MEDICINE

## 2020-01-13 DIAGNOSIS — N18.30 CHRONIC KIDNEY DISEASE, STAGE III (MODERATE) (HCC): ICD-10-CM

## 2020-01-13 PROCEDURE — 76775 US EXAM ABDO BACK WALL LIM: CPT

## 2020-01-29 ENCOUNTER — APPOINTMENT (OUTPATIENT)
Dept: LAB | Facility: HOSPITAL | Age: 60
End: 2020-01-29

## 2020-02-18 ENCOUNTER — LAB (OUTPATIENT)
Dept: LAB | Facility: HOSPITAL | Age: 60
End: 2020-02-18

## 2020-02-18 DIAGNOSIS — D72.829 LEUKOCYTOSIS, UNSPECIFIED TYPE: ICD-10-CM

## 2020-02-18 DIAGNOSIS — N18.30 STAGE 3 CHRONIC KIDNEY DISEASE (HCC): ICD-10-CM

## 2020-02-18 DIAGNOSIS — N40.0 BENIGN PROSTATIC HYPERPLASIA WITHOUT LOWER URINARY TRACT SYMPTOMS: ICD-10-CM

## 2020-02-18 LAB
ALBUMIN SERPL-MCNC: 4.5 G/DL (ref 3.5–5.2)
ALBUMIN/GLOB SERPL: 1.3 G/DL
ALP SERPL-CCNC: 99 U/L (ref 39–117)
ALT SERPL W P-5'-P-CCNC: 56 U/L (ref 1–41)
ANION GAP SERPL CALCULATED.3IONS-SCNC: 15 MMOL/L (ref 5–15)
AST SERPL-CCNC: 27 U/L (ref 1–40)
B2 MICROGLOB SERPL-MCNC: 2.3 MG/L (ref 0.8–2.2)
BASOPHILS # BLD AUTO: 0.07 10*3/MM3 (ref 0–0.2)
BASOPHILS NFR BLD AUTO: 0.5 % (ref 0–1.5)
BILIRUB SERPL-MCNC: 1.1 MG/DL (ref 0.2–1.2)
BUN BLD-MCNC: 14 MG/DL (ref 6–20)
BUN/CREAT SERPL: 10.6 (ref 7–25)
CALCIUM SPEC-SCNC: 9.7 MG/DL (ref 8.6–10.5)
CHLORIDE SERPL-SCNC: 99 MMOL/L (ref 98–107)
CO2 SERPL-SCNC: 26 MMOL/L (ref 22–29)
CREAT BLD-MCNC: 1.32 MG/DL (ref 0.76–1.27)
DEPRECATED RDW RBC AUTO: 42.8 FL (ref 37–54)
EOSINOPHIL # BLD AUTO: 0.17 10*3/MM3 (ref 0–0.4)
EOSINOPHIL NFR BLD AUTO: 1.3 % (ref 0.3–6.2)
ERYTHROCYTE [DISTWIDTH] IN BLOOD BY AUTOMATED COUNT: 12.6 % (ref 12.3–15.4)
GFR SERPL CREATININE-BSD FRML MDRD: 56 ML/MIN/1.73
GLOBULIN UR ELPH-MCNC: 3.5 GM/DL
GLUCOSE BLD-MCNC: 107 MG/DL (ref 65–99)
HCT VFR BLD AUTO: 45.9 % (ref 37.5–51)
HGB BLD-MCNC: 15.7 G/DL (ref 13–17.7)
IMM GRANULOCYTES # BLD AUTO: 0.06 10*3/MM3 (ref 0–0.05)
IMM GRANULOCYTES NFR BLD AUTO: 0.5 % (ref 0–0.5)
LDH SERPL-CCNC: 155 U/L (ref 135–225)
LYMPHOCYTES # BLD AUTO: 2.69 10*3/MM3 (ref 0.7–3.1)
LYMPHOCYTES NFR BLD AUTO: 20.5 % (ref 19.6–45.3)
MCH RBC QN AUTO: 31.8 PG (ref 26.6–33)
MCHC RBC AUTO-ENTMCNC: 34.2 G/DL (ref 31.5–35.7)
MCV RBC AUTO: 92.9 FL (ref 79–97)
MONOCYTES # BLD AUTO: 0.9 10*3/MM3 (ref 0.1–0.9)
MONOCYTES NFR BLD AUTO: 6.9 % (ref 5–12)
NEUTROPHILS # BLD AUTO: 9.21 10*3/MM3 (ref 1.7–7)
NEUTROPHILS NFR BLD AUTO: 70.3 % (ref 42.7–76)
NRBC BLD AUTO-RTO: 0 /100 WBC (ref 0–0.2)
PLATELET # BLD AUTO: 239 10*3/MM3 (ref 140–450)
PMV BLD AUTO: 11.8 FL (ref 6–12)
POTASSIUM BLD-SCNC: 3.7 MMOL/L (ref 3.5–5.2)
PROT SERPL-MCNC: 8 G/DL (ref 6–8.5)
RBC # BLD AUTO: 4.94 10*6/MM3 (ref 4.14–5.8)
SODIUM BLD-SCNC: 140 MMOL/L (ref 136–145)
WBC NRBC COR # BLD: 13.1 10*3/MM3 (ref 3.4–10.8)

## 2020-02-18 PROCEDURE — 85025 COMPLETE CBC W/AUTO DIFF WBC: CPT

## 2020-02-18 PROCEDURE — 83615 LACTATE (LD) (LDH) ENZYME: CPT

## 2020-02-18 PROCEDURE — 82232 ASSAY OF BETA-2 PROTEIN: CPT

## 2020-02-18 PROCEDURE — 80053 COMPREHEN METABOLIC PANEL: CPT

## 2020-02-18 PROCEDURE — 36415 COLL VENOUS BLD VENIPUNCTURE: CPT
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
## 2020-02-28 ENCOUNTER — OFFICE VISIT (OUTPATIENT)
Dept: ONCOLOGY | Facility: CLINIC | Age: 60
End: 2020-02-28

## 2020-02-28 VITALS
BODY MASS INDEX: 27.77 KG/M2 | TEMPERATURE: 96.8 F | DIASTOLIC BLOOD PRESSURE: 78 MMHG | SYSTOLIC BLOOD PRESSURE: 138 MMHG | HEIGHT: 68 IN | WEIGHT: 183.2 LBS | OXYGEN SATURATION: 96 % | RESPIRATION RATE: 16 BRPM | HEART RATE: 64 BPM

## 2020-02-28 DIAGNOSIS — N18.30 STAGE 3 CHRONIC KIDNEY DISEASE (HCC): Primary | ICD-10-CM

## 2020-02-28 DIAGNOSIS — D72.829 LEUKOCYTOSIS, UNSPECIFIED TYPE: ICD-10-CM

## 2020-02-28 PROCEDURE — 99214 OFFICE O/P EST MOD 30 MIN: CPT | Performed by: INTERNAL MEDICINE

## 2020-02-28 RX ORDER — ERGOCALCIFEROL 1.25 MG/1
50000 CAPSULE ORAL WEEKLY
COMMUNITY

## 2020-06-19 ENCOUNTER — APPOINTMENT (OUTPATIENT)
Dept: LAB | Facility: HOSPITAL | Age: 60
End: 2020-06-19

## (undated) DEVICE — FORCEPS BX L240CM JAW DIA2.4MM ORNG L CAP W/ NDL DISP RAD

## (undated) DEVICE — ENDO KIT,LOURDES HOSPITAL: Brand: MEDLINE INDUSTRIES, INC.